# Patient Record
Sex: FEMALE | NOT HISPANIC OR LATINO | URBAN - NONMETROPOLITAN AREA
[De-identification: names, ages, dates, MRNs, and addresses within clinical notes are randomized per-mention and may not be internally consistent; named-entity substitution may affect disease eponyms.]

---

## 2018-10-24 ENCOUNTER — OFFICE VISIT CONVERTED (OUTPATIENT)
Dept: FAMILY MEDICINE CLINIC | Age: 83
End: 2018-10-24
Attending: FAMILY MEDICINE

## 2019-01-24 ENCOUNTER — OFFICE VISIT CONVERTED (OUTPATIENT)
Dept: FAMILY MEDICINE CLINIC | Age: 84
End: 2019-01-24
Attending: FAMILY MEDICINE

## 2019-01-24 ENCOUNTER — HOSPITAL ENCOUNTER (OUTPATIENT)
Dept: OTHER | Facility: HOSPITAL | Age: 84
Discharge: HOME | End: 2019-01-24
Attending: FAMILY MEDICINE

## 2019-01-24 LAB
APPEARANCE UR: CLEAR
BACTERIA UR CULT: NORMAL
BACTERIA UR QL AUTO: ABNORMAL
BILIRUB UR QL: NEGATIVE
CASTS URNS QL MICRO: ABNORMAL /[LPF]
COLOR UR: YELLOW
CONV LEUKOCYTE ESTERASE: ABNORMAL
CONV UROBILINOGEN IN URINE BY AUTOMATED TEST STRIP: 0.2 {EHRLICHU}/DL (ref 0.1–1)
EPI CELLS #/AREA URNS HPF: ABNORMAL /[HPF]
GLUCOSE 24H UR-MCNC: NEGATIVE MG/DL
HGB UR QL STRIP: NEGATIVE
KETONES UR QL STRIP: NEGATIVE MG/DL
MUCOUS THREADS URNS QL MICRO: ABNORMAL
NITRITE UR-MCNC: POSITIVE MG/ML
PH UR STRIP.AUTO: 5.5 [PH] (ref 5–8)
PROT UR-MCNC: NEGATIVE MG/DL
RBC # BLD AUTO: ABNORMAL /[HPF]
SP GR UR STRIP: 1.02 (ref 1–1.03)
SPECIMEN SOURCE: ABNORMAL
UNIDENT CRYS URNS QL MICRO: ABNORMAL /[HPF]
WBC #/AREA URNS HPF: ABNORMAL /[HPF]

## 2019-01-26 LAB
AMOXICILLIN+CLAV SUSC ISLT: 16
AMPICILLIN SUSC ISLT: >=32
AMPICILLIN+SULBAC SUSC ISLT: 16
BACTERIA UR CULT: ABNORMAL
CEFAZOLIN SUSC ISLT: <=4
CEFEPIME SUSC ISLT: <=1
CEFTAZIDIME SUSC ISLT: <=1
CEFTRIAXONE SUSC ISLT: <=1
CEFUROXIME ORAL SUSC ISLT: 4
CEFUROXIME PARENTER SUSC ISLT: 4
CIPROFLOXACIN SUSC ISLT: <=0.25
ERTAPENEM SUSC ISLT: <=0.5
GENTAMICIN SUSC ISLT: <=1
LEVOFLOXACIN SUSC ISLT: <=0.12
NITROFURANTOIN SUSC ISLT: <=16
TETRACYCLINE SUSC ISLT: <=1
TMP SMX SUSC ISLT: <=20
TOBRAMYCIN SUSC ISLT: <=1

## 2019-04-24 ENCOUNTER — OFFICE VISIT CONVERTED (OUTPATIENT)
Dept: FAMILY MEDICINE CLINIC | Age: 84
End: 2019-04-24
Attending: FAMILY MEDICINE

## 2019-06-24 ENCOUNTER — OFFICE VISIT CONVERTED (OUTPATIENT)
Dept: FAMILY MEDICINE CLINIC | Age: 84
End: 2019-06-24
Attending: FAMILY MEDICINE

## 2019-09-27 ENCOUNTER — OFFICE VISIT CONVERTED (OUTPATIENT)
Dept: FAMILY MEDICINE CLINIC | Age: 84
End: 2019-09-27
Attending: FAMILY MEDICINE

## 2019-09-27 ENCOUNTER — HOSPITAL ENCOUNTER (OUTPATIENT)
Dept: OTHER | Facility: HOSPITAL | Age: 84
Discharge: HOME | End: 2019-09-27
Attending: FAMILY MEDICINE

## 2019-09-27 LAB
ALBUMIN SERPL-MCNC: 3.8 G/DL (ref 3.5–5)
ALBUMIN/GLOB SERPL: 1.3 {RATIO} (ref 1.4–2.6)
ALP SERPL-CCNC: 58 U/L (ref 38–185)
ALT SERPL-CCNC: 8 U/L (ref 10–40)
ANION GAP SERPL CALC-SCNC: 20 MMOL/L (ref 8–19)
APPEARANCE UR: ABNORMAL
AST SERPL-CCNC: 14 U/L (ref 15–50)
BACTERIA UR CULT: NORMAL
BACTERIA UR QL AUTO: ABNORMAL
BASOPHILS # BLD MANUAL: 0.03 10*3/UL (ref 0–0.2)
BASOPHILS NFR BLD MANUAL: 0.5 % (ref 0–3)
BILIRUB SERPL-MCNC: 0.42 MG/DL (ref 0.2–1.3)
BILIRUB UR QL: NEGATIVE
BUN SERPL-MCNC: 16 MG/DL (ref 5–25)
BUN/CREAT SERPL: 14 {RATIO} (ref 6–20)
CALCIUM SERPL-MCNC: 9.8 MG/DL (ref 8.7–10.4)
CASTS URNS QL MICRO: ABNORMAL /[LPF]
CHLORIDE SERPL-SCNC: 100 MMOL/L (ref 99–111)
COLOR UR: YELLOW
CONV CO2: 22 MMOL/L (ref 22–32)
CONV LEUKOCYTE ESTERASE: ABNORMAL
CONV TOTAL PROTEIN: 6.7 G/DL (ref 6.3–8.2)
CONV UROBILINOGEN IN URINE BY AUTOMATED TEST STRIP: 1 {EHRLICHU}/DL (ref 0.1–1)
CREAT UR-MCNC: 1.18 MG/DL (ref 0.5–0.9)
DEPRECATED RDW RBC AUTO: 48.3 FL
EOSINOPHIL # BLD MANUAL: 0.15 10*3/UL (ref 0–0.7)
EOSINOPHIL NFR BLD MANUAL: 2.4 % (ref 0–7)
EPI CELLS #/AREA URNS HPF: ABNORMAL /[HPF]
ERYTHROCYTE [DISTWIDTH] IN BLOOD BY AUTOMATED COUNT: 14.1 % (ref 11.5–14.5)
GFR SERPLBLD BASED ON 1.73 SQ M-ARVRAT: 39 ML/MIN/{1.73_M2}
GLOBULIN UR ELPH-MCNC: 2.9 G/DL (ref 2–3.5)
GLUCOSE 24H UR-MCNC: NEGATIVE MG/DL
GLUCOSE SERPL-MCNC: 136 MG/DL (ref 65–99)
GRANS (ABSOLUTE): 4.02 10*3/UL (ref 2–8)
GRANS: 63.2 % (ref 30–85)
HBA1C MFR BLD: 13.8 G/DL (ref 12–16)
HCT VFR BLD AUTO: 42.6 % (ref 37–47)
HGB UR QL STRIP: NEGATIVE
IMM GRANULOCYTES # BLD: 0.02 10*3/UL (ref 0–0.54)
IMM GRANULOCYTES NFR BLD: 0.3 % (ref 0–0.43)
KETONES UR QL STRIP: NEGATIVE MG/DL
LYMPHOCYTES # BLD MANUAL: 1.56 10*3/UL (ref 1–5)
LYMPHOCYTES NFR BLD MANUAL: 9 % (ref 3–10)
MCH RBC QN AUTO: 29.8 PG (ref 27–31)
MCHC RBC AUTO-ENTMCNC: 32.4 G/DL (ref 33–37)
MCV RBC AUTO: 92 FL (ref 81–99)
MONOCYTES # BLD AUTO: 0.57 10*3/UL (ref 0.2–1.2)
MUCOUS THREADS URNS QL MICRO: ABNORMAL
NITRITE UR-MCNC: POSITIVE MG/ML
OSMOLALITY SERPL CALC.SUM OF ELEC: 287 MOSM/KG (ref 273–304)
PH UR STRIP.AUTO: 6.5 [PH] (ref 5–8)
PLATELET # BLD AUTO: 280 10*3/UL (ref 130–400)
PMV BLD AUTO: 10.6 FL (ref 7.4–10.4)
POTASSIUM SERPL-SCNC: 4.5 MMOL/L (ref 3.5–5.3)
PROT UR-MCNC: NEGATIVE MG/DL
RBC # BLD AUTO: 4.63 10*6/UL (ref 4.2–5.4)
RBC # BLD AUTO: ABNORMAL /[HPF]
SODIUM SERPL-SCNC: 137 MMOL/L (ref 135–147)
SP GR UR STRIP: 1.02 (ref 1–1.03)
SPECIMEN SOURCE: ABNORMAL
TSH SERPL-ACNC: 3.08 M[IU]/L (ref 0.27–4.2)
UNIDENT CRYS URNS QL MICRO: ABNORMAL /[HPF]
VARIANT LYMPHS NFR BLD MANUAL: 24.6 % (ref 20–45)
WBC # BLD AUTO: 6.35 10*3/UL (ref 4.8–10.8)
WBC #/AREA URNS HPF: ABNORMAL /[HPF]

## 2019-09-29 LAB
AMOXICILLIN+CLAV SUSC ISLT: <=2
AMPICILLIN SUSC ISLT: <=2
AMPICILLIN+SULBAC SUSC ISLT: <=2
BACTERIA UR CULT: ABNORMAL
CEFAZOLIN SUSC ISLT: <=4
CEFEPIME SUSC ISLT: <=1
CEFTAZIDIME SUSC ISLT: <=1
CEFTRIAXONE SUSC ISLT: <=1
CEFUROXIME ORAL SUSC ISLT: 4
CEFUROXIME PARENTER SUSC ISLT: 4
CIPROFLOXACIN SUSC ISLT: <=0.25
ERTAPENEM SUSC ISLT: <=0.5
GENTAMICIN SUSC ISLT: <=1
LEVOFLOXACIN SUSC ISLT: 0.5
NITROFURANTOIN SUSC ISLT: <=16
TETRACYCLINE SUSC ISLT: >=16
TMP SMX SUSC ISLT: >=320
TOBRAMYCIN SUSC ISLT: <=1

## 2019-12-18 ENCOUNTER — OFFICE VISIT CONVERTED (OUTPATIENT)
Dept: FAMILY MEDICINE CLINIC | Age: 84
End: 2019-12-18
Attending: FAMILY MEDICINE

## 2020-04-02 ENCOUNTER — OFFICE VISIT CONVERTED (OUTPATIENT)
Dept: FAMILY MEDICINE CLINIC | Age: 85
End: 2020-04-02
Attending: FAMILY MEDICINE

## 2021-05-18 NOTE — PROGRESS NOTES
Amber Delvalle 10/20/1925     Office/Outpatient Visit    Visit Date:  10:24 am    Provider: Pedro Mckenna MD (Assistant: Jessie Handley MA)    Location: [facility]        Electronically signed by Pedro Mckenna MD on  2019 03:13:05 PM                             SUBJECTIVE:        CC:     Ms. Delvalle is a 89 year old White female.  This is a follow-up visit.  3 MONTH CHECK UP;         HPI:         Patient presents with hTN.  Her current cardiac medication regimen includes a beta-blocker and an ACE inhibitor.  Compliance with treatment has been good.          Dx with chronic low back pain; the discomfort is most prominent in the lower thoracic spine and in the lumbar spine.  The pain does not radiate.  She characterizes it as constant and moderate in intensity.  This is a chronic problem, with essentially constant pain.  She states that the current episode of pain started years ago.  She does not recall any precipitating event or injury.  She notes some pain relief with narcotic pain medication.  Other details: MRI SHOWED DJD AND SPINAL STENOSIS.  NO CHANGE WITH INJECTIONS.  HAS FAILED VARIUS MEDS.  DOING WELL ON CURRENT MEDS.  NEEDS RX..      ROS:     CONSTITUTIONAL:  Negative for chills and fever.      E/N/T:  Negative for ear pain, nasal congestion and sore throat.      CARDIOVASCULAR:  Negative for chest pain, palpitations and pedal edema.      RESPIRATORY:  Negative for recent cough and dyspnea.      GASTROINTESTINAL:  Negative for abdominal pain, nausea and vomiting.      MUSCULOSKELETAL:  Negative for myalgias.      NEUROLOGICAL:  Positive for weakness ( generalized ).          PMH/FMH/SH:     Last Reviewed on 2019 03:10 PM by Pedro Mckenna    Past Medical History:                 PAST MEDICAL HISTORY             GYNECOLOGICAL HISTORY:             CURRENT MEDICAL PROVIDERS:    Oncologist         PREVENTIVE HEALTH MAINTENANCE             COLORECTAL  CANCER SCREENING: Up to date (colonoscopy q10y; sigmoidoscopy q5y; Cologuard q3y) was last done 1998; colonoscopy with normal results         Surgical History:         Appendectomy    Cholecystectomy    Tonsillectomy/Adenoidectomy      Hysterectomy: COMPLETE, WITH BSO;;;     Joint Replacement: LEGT/RIGHT TKR;;;         Family History:     Father: Coronary Artery Disease         Social History:     Occupation:    Retired         Tobacco/Alcohol/Supplements:     Last Reviewed on 4/24/2019 03:10 PM by Pedro Mckenna    Tobacco: She has a past history of cigarette smoking; quit date:  1993.  Non-drinker     Caffeine:  She admits to consuming caffeine via coffee ( 2 servings per day ).          Substance Abuse History:     Last Reviewed on 4/24/2019 03:10 PM by Pedro Mckenna    None         Mental Health History:     Last Reviewed on 10/24/2018 11:31 AM by Rubina Shearer        Communicable Diseases (eg STDs):     Last Reviewed on 10/24/2018 11:31 AM by Rubina Shearer            Current Problems:     Last Reviewed on 4/24/2019 03:11 PM by Pedro Mckenna    Urinary incontinence     Generalized weakness     Breast cancer     GERD     HTN     Acquired hypothyroidism     Chronic low back pain     Use of high risk medications         Immunizations:     Fluzone High-Dose pf (>=65 yr) 10/23/2018     Fluzone High-Dose pf (>=65 yr) 10/24/2017         Allergies:     Last Reviewed on 4/24/2019 03:10 PM by Pedro Mckenna      No Known Drug Allergies.         Current Medications:     Last Reviewed on 4/24/2019 03:10 PM by Pedro Mckenna    Fentanyl 25mcg/1hr Transdermal Patch Apply 1 patch(es) to upper torso as directed every 72 hours     Levothyroxine Sodium 50mcg Capsules 1 p.o. daily     Lisinopril 10mg Tablet Take 1 tablet(s) by mouth daily     Metoprolol Succinate 50mg Tablets, Extended Release Take 1 tablet(s) by mouth daily     Ranitidine 75mg Tablet Take 1 tablet(s) by mouth q           OBJECTIVE:        Vitals:         Current: 4/24/2019 10:32:38 AM    Ht:  5 ft, 7 in;  Wt: 227 lbs;  BMI: 35.6    T: 98.2 F (oral);  BP: 150/64 mm Hg (left arm, sitting);  P: 68 bpm (left arm (BP Cuff), sitting)        Exams:     PHYSICAL EXAM:     GENERAL: vital signs recorded - well developed, well nourished;  no apparent distress;     NECK: trachea is midline; thyroid is non-palpable; carotid exam reveals no bruits;     RESPIRATORY: normal respiratory rate and pattern with no distress; normal breath sounds with no rales, rhonchi, wheezes or rubs;     CARDIOVASCULAR: normal rate; rhythm is regular;  no systolic murmur; trace pedal edema;     GASTROINTESTINAL: nontender;     LYMPHATIC: no enlargement of cervical or facial nodes;     MUSCULOSKELETAL: gait: slowed and uses a walker;  No CVA tenderness     NEUROLOGIC: GROSSLY INTACT     PSYCHIATRIC:  appropriate affect and demeanor; normal speech pattern; grossly normal memory;         ASSESSMENT           401.1   I10  HTN              DDx:     724.2   M54.16  Chronic low back pain              DDx:         PLAN:          HTN         MEDICATIONS: (no change to current medication regimen)     FOLLOW-UP: Schedule a follow-up visit in 3 months.           Chronic low back pain     Controlled substance documentation: Markell reviewed; prior drug screen consistent; consent is reviewed and signed and on the chart.  She is aware of risk of addiction on this medication, understands that she will need to follow up for a review every 3 months and her medications will be adjusted or decreased as deemed appropriate at each visit.  No history of drug or alcohol abuse.  No concerns about diversion or abuse. She denies side effects related to the medication.  She is aware that she may be called in for pill counts.  The dosing of this medication will be reviewed on a regular basis and reduced if possible..  Ongoing use of a controlled substance is necessary for this patient  to have a normal quality of life             Patient Recommendations:        For  HTN:     Schedule a follow-up visit in 3 months.              CHARGE CAPTURE           **Please note: ICD descriptions below are intended for billing purposes only and may not represent clinical diagnoses**        Primary Diagnosis:         401.1 HTN            I10    Essential (primary) hypertension              Orders:          62403   Office/outpatient visit; established patient, level 4  (In-House)           724.2 Chronic low back pain            M54.16    Radiculopathy, lumbar region

## 2021-05-18 NOTE — PROGRESS NOTES
Amber Delvalle 10/20/1925     Office/Outpatient Visit    Visit Date: Fri, Sep 27, 2019 09:38 am    Provider: Pedro Mckenna MD (Assistant: Nirmala Morrison MA)    Location: [facility]        Electronically signed by Pedro Mckenna MD on  09/27/2019 12:16:41 PM                             SUBJECTIVE:        CC:     Ms. Delvalle is a 89 year old White female.  This is a follow-up visit.  MCW;         HPI:         Ms. Delvalle is here for a Medicare wellness visit.  The required HRA questions are integrated within this visit note. Family medical history and individual medical/surgical history were reviewed and updated.  A current height, weight, BMI, blood pressure, and pulse were recorded in the vitals section of the note and have been reviewed. Patient's medications, including supplements, were recorded in the chart and reviewed.  Current providers and suppliers were reviewed and updated.          Self-Assessment of Health: She rates her health as fair.  . Her physical/emotional health has limited her social activites not at all.  A review of possible cognitive impairment was performed and the following was noted: Memory Impairment Screen is normal.  A review of functional ability and level of safety was performed and the following was noted: Bathing ( Performs with assistance ); Dressing: ( performs independently ); Eating: ( performs independently ); Toilet use: ( performs independently ); Transferring: ( performs independently ) Falls Risk: Has not had any falls or only one fall without injury in the past year.  She denies having trouble hearing the TV/radio when others do not, having to strain to hear or understand conversations and wearing hearing aid(s).  Concerning home safety, she reports that at home she DOES have adequate lighting, a skid resistant shower/tub, grab bars in the bath, handrails on stairs, functioning smoke alarms and absence of throw rugs.          Immunization Status:  UNKNOWN; Physical Activity: She never excercises.; Type of diet patient normally eats is described as well-balanced with fruits and vegetables Tobacco: She has never smoked. She has a past history of cigarette smoking; quit date:  .  Preventative Health updated today         PHQ-9 Depression Screening: Completed form scanned and in chart; Total Score 2     ROS:     CONSTITUTIONAL:  Negative for chills and fever.      EYES:  Positive for blurred vision.   Negative for eye drainage.      E/N/T:  Negative for ear pain, nasal congestion and sore throat.      CARDIOVASCULAR:  Negative for chest pain, palpitations and pedal edema.      RESPIRATORY:  Negative for recent cough and dyspnea.      GASTROINTESTINAL:  Negative for abdominal pain, anorexia, constipation, diarrhea, nausea and vomiting.      GENITOURINARY:  Negative for dysuria and hematuria.      MUSCULOSKELETAL:  Negative for myalgias.      NEUROLOGICAL:  Positive for weakness ( generalized ).          PMH/FMH/SH:     Last Reviewed on 2019 09:54 AM by Pedro Mckenna    Past Medical History:                 PAST MEDICAL HISTORY             GYNECOLOGICAL HISTORY:             PREVENTIVE HEALTH MAINTENANCE             COLORECTAL CANCER SCREENING: Up to date (colonoscopy q10y; sigmoidoscopy q5y; Cologuard q3y) was last done ; colonoscopy with normal results     Hepatitis C Medicare Screening: Not indicated     MAMMOGRAM: no longer indicated     PAP SMEAR: No longer indicated due to age and history         Surgical History:         Appendectomy    Cholecystectomy    Tonsillectomy/Adenoidectomy      Hysterectomy: COMPLETE, WITH BSO;;;     Joint Replacement: LEGT/RIGHT TKR;;;         Family History:     Father: Coronary Artery Disease         Social History:     Occupation:    Retired         Tobacco/Alcohol/Supplements:     Last Reviewed on 2019 09:52 AM by Pedro Mckenna    Tobacco: She has a past history of cigarette smoking;  quit date:  1993.  Non-drinker     Caffeine:  She admits to consuming caffeine via coffee ( 2 servings per day ).          Substance Abuse History:     Last Reviewed on 9/27/2019 09:52 AM by Pedro Mckenna    None         Mental Health History:     Last Reviewed on 10/24/2018 11:31 AM by Rubina Shearer        Communicable Diseases (eg STDs):     Last Reviewed on 10/24/2018 11:31 AM by Rubina Shearer            Current Problems:     Last Reviewed on 9/27/2019 09:56 AM by Pedro Mckenna    History of breast cancer     Urinary incontinence     Generalized weakness     GERD     HTN     Acquired hypothyroidism     Chronic low back pain     Use of high risk medications     Screening for depression         Immunizations:     Fluzone High-Dose pf (>=65 yr) 10/23/2018     Fluzone High-Dose pf (>=65 yr) 10/24/2017         Allergies:     Last Reviewed on 9/27/2019 09:52 AM by Pedro Mckenna      No Known Drug Allergies.         Current Medications:     Last Reviewed on 9/27/2019 09:55 AM by Pedro Mckenna    Fentanyl 25mcg/1hr Transdermal Patch Apply 1 patch(es) to upper torso as directed every 72 hours     Lisinopril 10mg Tablet Take 1 tablet(s) by mouth daily     Metoprolol Succinate 50mg Tablets, Extended Release Take 1 tablet(s) by mouth daily     Ranitidine 75mg Tablet Take 1 tablet(s) by mouth q hs     Levothyroxine Sodium 50mcg Capsules 1 p.o. daily         OBJECTIVE:        Vitals:         Current: 9/27/2019 9:48:49 AM    Ht:  5 ft, 7 in;  Wt: 231.4 lbs;  BMI: 36.2    T: 98.6 F (oral);  BP: 153/66 mm Hg (left arm, sitting);  P: 77 bpm (left arm (BP Cuff), sitting)    VA: 20/400 OD, 20/400 OS (with correction)        Exams:     PHYSICAL EXAM:     GENERAL: vital signs recorded - well developed, well nourished;  no apparent distress;     EYES: conjunctiva and cornea are normal;     E/N/T:  normal EACs, TMs, nasal/oral mucosa, teeth, gingiva, and oropharynx;     NECK: trachea is  midline; thyroid is non-palpable; carotid exam reveals no bruits;     RESPIRATORY: normal respiratory rate and pattern with no distress; normal breath sounds with no rales, rhonchi, wheezes or rubs;     CARDIOVASCULAR: normal rate; rhythm is regular;  no systolic murmur; trace pedal edema;     GASTROINTESTINAL: nontender;     LYMPHATIC: no enlargement of cervical or facial nodes; no supraclavicular nodes;     MUSCULOSKELETAL: gait: slowed and uses a walker;  No CVA tenderness     NEUROLOGIC: GROSSLY INTACT     PSYCHIATRIC:  appropriate affect and demeanor; normal speech pattern; grossly normal memory;         Lab/Test Results:             Urine temperature:  confirmed (09/27/2019),     All urine drug screen levels confirmed negative:  yes (09/27/2019),     Date and time of last pill:  Fentanyl Transdermal Patch 9/26/19 per patient. (09/27/2019),     Performed by:  mikey (09/27/2019),     Collection Time:  1020 (09/27/2019),             Procedures:     Vaccination against other viral diseases, Influenza     1. Influenza high dose 0.5 ml unit dose, KG, ABN signed given IM in the left upper arm;  lot number [id number]; expires 03/26/2020 Regarding contraindications to an Influenza vaccine: Denies moderate/severe illness with/without fever; serious reaction to eggs, egg proteins, gentamicin, gelatin, arginine, neomycin or polymixin; serious reaction after recieving previous influenza vaccines; and history of Guillain-Beaumont Syndrome.              ASSESSMENT           V70.0   Z00.00  Health checkup              DDx:     V58.69   Z79.899  Use of high risk medications              DDx:     401.1   I10  HTN              DDx:     V04.81   Z23  Vaccination against other viral diseases, Influenza              DDx:     V79.0   Z13.89  Screening for depression              DDx:         ORDERS:         Lab Orders:       62196  Drug test prsmv qual dir optical obs per day  (In-House)         68306  [facility] CBC with 3 part diff   (Send-Out)         59304  COMP - [facility] Comp. Metabolic Panel  (Send-Out)         06610  TSH [facility] TSH  (Send-Out)         01954  [facility]UA - [facility] Urinalysis, automated, with micro  (Send-Out)           Procedures Ordered:       49256  Fluzone High Dose  (In-House)           Annual wellness visit, includes a PPPS, subsequent visit  (In-House)           Other Orders:         Depression screen negative  (In-House)         1101F  Pt screen for fall risk; document no falls in past year or only 1 fall w/o injury in past year (CORNELL)  (In-House)           Administration of influenza virus vaccine (x1)                 PLAN:          Health checkup         COUNSELING provided on: fall prevention, healthy eating habits, Medicare Preventative Services Guide given to patient., use of seat belts, Advance Directive info given., and ADVISED TO SEE AN EYE DOCTOR AND DENTIST REGULARLY.      FOLLOW-UP: Schedule a follow-up visit in 3 months.  MIPS Negative Depression Screen           Orders:         Depression screen negative  (In-House)         1101F  Pt screen for fall risk; document no falls in past year or only 1 fall w/o injury in past year (CORNELL)  (In-House)           Annual wellness visit, includes a PPPS, subsequent visit  (In-House)             Patient Education Handouts:       Advance Directive           Use of high risk medications     LABORATORY:  Labs ordered to be performed today include Drug screen.            Orders:       20077  Drug test prsmv qual dir optical obs per day  (In-House)            HTN     LABORATORY:  Labs ordered to be performed today include CBC, Comprehensive metabolic panel, TSH, and urinalysis with micro.            Orders:       11499  [facility] CBC with 3 part diff  (Send-Out)         54228  COMP [facility] Comp. Metabolic Panel  (Send-Out)         37077  TSH [facility] TSH  (Send-Out)         [facility]  [facility]  ([facility])            Vaccination against  other viral diseases, Influenza         IMMUNIZATIONS given today: Influenza HIGH Dose.            Orders:       01452  Fluzone High Dose  (In-House)                     Administration of influenza virus vaccine (x1)             Patient Recommendations:        For  Health checkup:         Regularly exercise within recommended guidelines, especially to maintain balance. Remove obstacles in walkways at home.  Use non-skid material for bathtub safety.  Remove loose throw rugs from floor. Use nightlights in bedrooms, hallways, and bathrooms.    Limit dietary intake of fat (especially saturated fat) and cholesterol.  Eat a variety of foods, including plenty of fruits, vegetables, and grain containg fiber, limit fat intake to 30% of total calories. Balance caloric intake with energy expended.    Always use shoulder/lap restraints when driving or riding in a vehicle, even those equipped with air bags.  Schedule a follow-up visit in 3 months.              CHARGE CAPTURE           **Please note: ICD descriptions below are intended for billing purposes only and may not represent clinical diagnoses**        Primary Diagnosis:         V70.0 Health checkup            Z00.00    Encounter for general adult medical examination without abnormal findings              Orders:             Depression screen negative  (In-House)             1101F   Pt screen for fall risk; document no falls in past year or only 1 fall w/o injury in past year (CORNELL)  (In-House)                Annual wellness visit, includes a PPPS, subsequent visit  (In-House)           V58.69 Use of high risk medications            Z79.899    Other long term (current) drug therapy              Orders:          82934   Drug test prsmv qual dir optical obs per day  (In-House)           401.1 HTN            I10    Essential (primary) hypertension    V04.81 Vaccination against other viral diseases, Influenza            Z23    Encounter for immunization               Orders:          77915   Fluzone High Dose  (In-House)                                           Administration of influenza virus vaccine (x1)         V79.0 Screening for depression            Z13.89    Encounter for screening for other disorder

## 2021-05-18 NOTE — PROGRESS NOTES
Amber Delvalle  10/20/1925     Office/Outpatient Visit    Visit Date: Thu, Apr 2, 2020 01:08 pm    Provider: Cici Mckenna MD (Assistant: Nirmala Morrison MA)    Location: [facility]        Electronically signed by Cici Mckenna MD on  04/02/2020 01:42:49 PM                             Subjective:        CC: PATIENT DOES NOT KNOW WHAT MEDICATIONS SHE IS TAKING Ms. Delvalle is a 89 year old White female.  This is a follow-up visit.  check up.  PRESENT:  CICI STEELE MD;         HPI:           Patient presents with acquired hypothyroidism.  This was first diagnosed several years ago.  She is currently taking Levothyroid.  TSH was last checked more than 6 months ago.  The result was reported as normal.            Additionally, she presents with history of essential (primary) hypertension.  this was first diagnosed several years ago.  Her current cardiac medication regimen includes a beta-blocker and an ACE inhibitor.  Compliance with treatment has been good.  She is tolerating the medication well without side effects.  Ms. Delvalle does not check her blood pressure other than at her clinic appointments.            Radiculopathy, lumbar region details; the discomfort is most prominent in the lower thoracic spine and in the lumbar spine.  The pain does not radiate.  She characterizes it as constant and moderate in intensity.  This is a chronic problem, with essentially constant pain.  She states that the current episode of pain started years ago.  She does not recall any precipitating event or injury.  She notes some pain relief with narcotic pain medication.  Other details: MRI SHOWED DJD AND SPINAL STENOSIS.  NO CHANGE WITH INJECTIONS.  HAS FAILED VARIUS MEDS.  DOING WELL ON CURRENT MEDS.  NEEDS RX..            With regard to the gastro-esophageal reflux disease without esophagitis, the location of the discomfort is primarily epigastric.  She describes the pain as burning.   Symptoms are improved with a prescription-strength H-2 blocker.      ROS:     CONSTITUTIONAL:  Negative for chills and fever.      E/N/T:  Negative for ear pain, nasal congestion and sore throat.      CARDIOVASCULAR:  Negative for chest pain, palpitations and pedal edema.      RESPIRATORY:  Negative for recent cough and dyspnea.      GASTROINTESTINAL:  Negative for abdominal pain, nausea and vomiting.      MUSCULOSKELETAL:  Negative for myalgias.      NEUROLOGICAL:  Positive for weakness ( generalized ).   Negative for headaches.          Past Medical History / Family History / Social History:         Last Reviewed on 2020 01:20 PM by Pedro Mckenna    Past Medical History:                 PAST MEDICAL HISTORY             GYNECOLOGICAL HISTORY:                 ADVANCED DIRECTIVES: None         PREVENTIVE HEALTH MAINTENANCE             COLORECTAL CANCER SCREENING: Up to date (colonoscopy q10y; sigmoidoscopy q5y; Cologuard q3y) was last done ; colonoscopy with normal results     DENTAL CLEANING: has full dentures     EYE EXAM: was last done      Hepatitis C Medicare Screening: Not indicated     MAMMOGRAM: no longer indicated     PAP SMEAR: No longer indicated due to age and history         Surgical History:         Appendectomy    Cholecystectomy    Tonsillectomy/Adenoidectomy     Hysterectomy: COMPLETE, WITH BSO;;;     Joint Replacement: LEGT/RIGHT TKR;;;         Family History:     Father: Coronary Artery Disease         Social History:     Occupation:    Retired         Tobacco/Alcohol/Supplements:     Last Reviewed on 2020 01:20 PM by Pedro Mckenna    Tobacco: She has a past history of cigarette smoking; quit date:  .  Non-drinker     Caffeine:  She admits to consuming caffeine via coffee ( 2 servings per day ).          Substance Abuse History:     Last Reviewed on 2020 01:19 PM by Pedro Mckenna    None         Mental Health History:     Last Reviewed  on 10/24/2018 11:31 AM by Rubina Shearer        Communicable Diseases (eg STDs):     Last Reviewed on 10/24/2018 11:31 AM by Rubina Shearer        Current Problems:     Last Reviewed on 4/02/2020 01:20 PM by Pedro Mckenna    Essential (primary) hypertension    Radiculopathy, lumbar region    Weakness    Other long term (current) drug therapy    Acquired hypothyroidism    Urinary incontinence    History of breast cancer    Chronic kidney disease, Stage III (moderate)        Immunizations:     Fluzone High-Dose pf (>=65 yr) 10/23/2018    Fluzone High-Dose pf (>=65 yr) 10/24/2017    Fluzone High-Dose pf (>=65 yr) 9/27/2019        Allergies:     Last Reviewed on 4/02/2020 01:20 PM by Pedro Mckenna    No Known Allergies.        Current Medications:     Last Reviewed on 4/02/2020 01:20 PM by Pedro Mckenna    Levothyroxine Sodium 50mcg Capsules [1 p.o. daily]    metoprolol succinate 50 mg oral Tablet, Extended Release 24 hr [TAKE 1 TABLET(S) BY MOUTH DAILY]    lisinopriL 10 mg oral tablet [TAKE 1 TABLET(S) BY MOUTH TWICE DAILY]    fentaNYL 25 mcg/hr Transdermal Patch, Transdermal 72 Hours [APPLY 1 PATCH TO UPPER TORSO AS DIRECTED EVERY 72 HOURS]    Pepcid 20 mg oral tablet [take 1 tablet (20 mg) by oral route daily]        Assessment:         E03.8   Other specified hypothyroidism       I10   Essential (primary) hypertension       M54.16   Radiculopathy, lumbar region       K21.9   Gastro-esophageal reflux disease without esophagitis           ORDERS:         Meds Prescribed:       [Refilled] fentaNYL 25 mcg/hr Transdermal Patch, Transdermal 72 Hours [APPLY 1 PATCH TO UPPER TORSO AS DIRECTED EVERY 72 HOURS], #10 (ten) each, Refills: 0 (zero)                 Plan:         Other specified hypothyroidism        MEDICATIONS: (no change to current medication regimen)     FOLLOW-UP: Schedule a follow-up visit in 3 months.  Telehealth: Verbal consent obtained for visit to occur via phone call;  Total time spent was 6 minutes; [id number]--Hxashybfn E/M 5-10 minutes         Essential (primary) hypertension        MEDICATIONS: (no change to current medication regimen)     RECOMMENDATIONS given include: perform routine monitoring of blood pressure with home blood pressure cuff.          Radiculopathy, lumbar region    Controlled substance documentation: Markell reviewed; prior drug screen consistent; consent is reviewed and signed and on the chart.  She is aware of risk of addiction on this medication, understands that she will need to follow up for a review every 3 months and her medications will be adjusted or decreased as deemed appropriate at each visit.  No history of drug or alcohol abuse.  No concerns about diversion or abuse. She denies side effects related to the medication.  She is aware that she may be called in for pill counts.  The dosing of this medication will be reviewed on a regular basis and reduced if possible..  Ongoing use of a controlled substance is necessary for this patient to have a normal quality of life           Prescriptions:       [Refilled] fentaNYL 25 mcg/hr Transdermal Patch, Transdermal 72 Hours [APPLY 1 PATCH TO UPPER TORSO AS DIRECTED EVERY 72 HOURS], #10 (ten) each, Refills: 0 (zero)         Gastro-esophageal reflux disease without esophagitis        MEDICATIONS: (no change to current medication regimen)             Patient Recommendations:        For  Other specified hypothyroidism:    Schedule a follow-up visit in 3 months.          For  Essential (primary) hypertension:    Begin monitoring your blood pressure by brief nurse visits at our office, a home blood pressure monitor, or by checking on the machines in pharmacies or stores.  Keep a log of the readings.              Charge Capture:         Primary Diagnosis:     E03.8  Other specified hypothyroidism           Orders:      62239  Phys/QHP telephone evaluation 5-10 min  (In-House)              I10  Essential (primary)  hypertension     M54.16  Radiculopathy, lumbar region     K21.9  Gastro-esophageal reflux disease without esophagitis         ADDENDUMS:      ____________________________________    Addendum: 05/19/2020 11:23 AM - Two, Team         Visit Note Faxed to:        User Entered Recipient; Number [phone]

## 2021-05-18 NOTE — PROGRESS NOTES
Amber Delvalle 10/20/1925     Office/Outpatient Visit    Visit Date: Mon, Jun 24, 2019 10:53 am    Provider: Pedro Mckenna MD (Assistant: Surekha Espinoza LPN)    Location: [facility]        Electronically signed by Pedro Mckenna MD on  06/24/2019 12:18:19 PM                             SUBJECTIVE:        CC:     Ms. Delvalle is a 89 year old White female.  This is a follow-up visit.  3 month follow up;         HPI:         PHQ-9 Depression Screening: Completed form scanned and in chart; Total Score 4 Alcohol Consumption Screening: Completed form scanned and in chart; Total Score 0         With regard to the hTN, her current cardiac medication regimen includes a beta-blocker and an ACE inhibitor.  Compliance with treatment has been good.          Additionally, she presents with history of chronic low back pain.  the discomfort is most prominent in the lower thoracic spine and in the lumbar spine.  The pain does not radiate.  She characterizes it as constant and moderate in intensity.  This is a chronic problem, with essentially constant pain.  She states that the current episode of pain started years ago.  She does not recall any precipitating event or injury.  She notes some pain relief with narcotic pain medication.  Other details: MRI SHOWED DJD AND SPINAL STENOSIS.  NO CHANGE WITH INJECTIONS.  HAS FAILED VARIUS MEDS.  DOING WELL ON CURRENT MEDS.  NEEDS RX..      ROS:     CONSTITUTIONAL:  Negative for chills and fever.      E/N/T:  Negative for ear pain, nasal congestion and sore throat.      CARDIOVASCULAR:  Negative for chest pain, palpitations and pedal edema.      RESPIRATORY:  Negative for recent cough and dyspnea.      GASTROINTESTINAL:  Negative for abdominal pain, nausea and vomiting.      MUSCULOSKELETAL:  Negative for myalgias.      NEUROLOGICAL:  Positive for weakness ( generalized ).          PMH/FMH/SH:     Last Reviewed on 6/24/2019 12:13 PM by Pedro Mckenna    Past Medical  History:                 PAST MEDICAL HISTORY             GYNECOLOGICAL HISTORY:             PREVENTIVE HEALTH MAINTENANCE             COLORECTAL CANCER SCREENING: Up to date (colonoscopy q10y; sigmoidoscopy q5y; Cologuard q3y) was last done ; colonoscopy with normal results         Surgical History:         Appendectomy    Cholecystectomy    Tonsillectomy/Adenoidectomy      Hysterectomy: COMPLETE, WITH BSO;;;     Joint Replacement: LEGT/RIGHT TKR;;;         Family History:     Father: Coronary Artery Disease         Social History:     Occupation:    Retired         Tobacco/Alcohol/Supplements:     Last Reviewed on 2019 11:09 AM by Pedro Mckenna    Tobacco: She has a past history of cigarette smoking; quit date:  .  Non-drinker     Caffeine:  She admits to consuming caffeine via coffee ( 2 servings per day ).          Substance Abuse History:     Last Reviewed on 2019 12:13 PM by Pedro Mckenna    None         Mental Health History:     Last Reviewed on 10/24/2018 11:31 AM by Rubina Shearer        Communicable Diseases (eg STDs):     Last Reviewed on 10/24/2018 11:31 AM by Rubina Shearer            Current Problems:     Last Reviewed on 2019 12:14 PM by Pedro Mckenna    History of breast cancer     Urinary incontinence     Generalized weakness     GERD     HTN     Acquired hypothyroidism     Chronic low back pain     Use of high risk medications         Immunizations:     Fluzone High-Dose pf (>=65 yr) 10/23/2018     Fluzone High-Dose pf (>=65 yr) 10/24/2017         Allergies:     Last Reviewed on 2019 12:13 PM by Pedro Mckenna      No Known Drug Allergies.         Current Medications:     Last Reviewed on 2019 12:14 PM by Pedro Mckenna    Fentanyl 25mcg/1hr Transdermal Patch Apply 1 patch(es) to upper torso as directed every 72 hours     Levothyroxine Sodium 50mcg Capsules 1 p.o. daily     Lisinopril 10mg Tablet Take 1  tablet(s) by mouth daily     Metoprolol Succinate 50mg Tablets, Extended Release Take 1 tablet(s) by mouth daily     Ranitidine 75mg Tablet Take 1 tablet(s) by mouth q hs         OBJECTIVE:        Vitals:         Current: 6/24/2019 11:02:38 AM    Ht:  5 ft, 7 in;  Wt: 225 lbs;  BMI: 35.2    T: 98 F (oral);  BP: 151/92 mm Hg (right arm, sitting);  P: 59 bpm (left arm (BP Cuff), sitting)        Repeat:     11:05:00 AM     BP:   143/50mm Hg (right arm, sitting)         Exams:     PHYSICAL EXAM:     GENERAL: vital signs recorded - well developed, well nourished;  no apparent distress;     NECK: trachea is midline; thyroid is non-palpable; carotid exam reveals no bruits;     RESPIRATORY: normal respiratory rate and pattern with no distress; normal breath sounds with no rales, rhonchi, wheezes or rubs;     CARDIOVASCULAR: normal rate; rhythm is regular;  no systolic murmur; trace pedal edema;     GASTROINTESTINAL: nontender;     LYMPHATIC: no enlargement of cervical or facial nodes;     MUSCULOSKELETAL: gait: slowed and uses a walker;  No CVA tenderness     NEUROLOGIC: GROSSLY INTACT     PSYCHIATRIC:  appropriate affect and demeanor; normal speech pattern; grossly normal memory;         ASSESSMENT           V79.0   Z13.89  Screening for depression              DDx:     724.2   M54.16  Chronic low back pain              DDx:     401.1   I10  HTN              DDx:         ORDERS:         Meds Prescribed:       Refill of: Fentanyl 25mcg/1hr Transdermal Patch Apply 1 patch(es) to upper torso as directed every 72 hours  #10 (Ten) patch(es) Refills: 0         Lab Orders:       APPTO  Appointment need  (In-House)           Other Orders:         Depression screen negative  (In-House)           Negative EtOH screen  (In-House)                   PLAN:          Screening for depression     MIPS Negative Depression Screen Negative alcohol screen     FOLLOW-UP: Schedule a follow-up visit in 3 months..   for ROUTINE LABS AT  NEXT VISIT for Medicare Wellness Visit           Orders:       APPTO  Appointment need  (In-House)           Depression screen negative  (In-House)           Negative EtOH screen  (In-House)            Chronic low back pain     Controlled substance documentation: Markell reviewed; prior drug screen consistent; consent is reviewed and signed and on the chart.  She is aware of risk of addiction on this medication, understands that she will need to follow up for a review every 3 months and her medications will be adjusted or decreased as deemed appropriate at each visit.  No history of drug or alcohol abuse.  No concerns about diversion or abuse. She denies side effects related to the medication.  She is aware that she may be called in for pill counts.  The dosing of this medication will be reviewed on a regular basis and reduced if possible..  Ongoing use of a controlled substance is necessary for this patient to have a normal quality of life           Prescriptions:       Refill of: Fentanyl 25mcg/1hr Transdermal Patch Apply 1 patch(es) to upper torso as directed every 72 hours  #10 (Ten) patch(es) Refills: 0          HTN         MEDICATIONS: (no change to current medication regimen)             Patient Recommendations:        For  Screening for depression:     Schedule a follow-up visit in 3 months.                APPOINTMENT INFORMATION:        Monday Tuesday Wednesday Thursday Friday Saturday Sunday            Time:___________________AM  PM   Date:_____________________             CHARGE CAPTURE           **Please note: ICD descriptions below are intended for billing purposes only and may not represent clinical diagnoses**        Primary Diagnosis:         V79.0 Screening for depression            Z13.89    Encounter for screening for other disorder              Orders:          APPTO   Appointment need  (In-House)                Depression screen negative  (In-House)                 Negative EtOH screen  (In-House)           724.2 Chronic low back pain            M54.16    Radiculopathy, lumbar region              Orders:          [id number]   Office/outpatient visit; established patient, level 4  (In-House)           401.1 HTN            I10    Essential (primary) hypertension

## 2021-05-18 NOTE — PROGRESS NOTES
Amber Delvalle  10/20/1925     Office/Outpatient Visit    Visit Date: Wed, Dec 18, 2019 09:35 am    Provider: Pedro Mckenna MD (Assistant: Jessie Handley MA)    Location: [facility]        Electronically signed by Pedro Mckenna MD on  12/18/2019 12:56:41 PM                             Subjective:        CC: Ms. Delvalle is a 89 year old White female.  This is a follow-up visit.  3 months;         HPI:           Ms. Delvalle presents with essential (primary) hypertension.  This was first diagnosed several years ago.  Her current cardiac medication regimen includes a beta-blocker and an ACE inhibitor.  Compliance with treatment has been good.  She is tolerating the medication well without side effects.  Ms. Delvalle does not check her blood pressure other than at her clinic appointments.            Radiculopathy, lumbar region details; the discomfort is most prominent in the lower thoracic spine and in the lumbar spine.  The pain does not radiate.  She characterizes it as constant and moderate in intensity.  This is a chronic problem, with essentially constant pain.  She states that the current episode of pain started years ago.  She does not recall any precipitating event or injury.  She notes some pain relief with narcotic pain medication.  Other details: MRI SHOWED DJD AND SPINAL STENOSIS.  NO CHANGE WITH INJECTIONS.  HAS FAILED VARIUS MEDS.  DOING WELL ON CURRENT MEDS.  NEEDS RX..      ROS:     CONSTITUTIONAL:  Negative for chills and fever.      E/N/T:  Negative for ear pain, nasal congestion and sore throat.      CARDIOVASCULAR:  Negative for chest pain, palpitations and pedal edema.      RESPIRATORY:  Negative for recent cough and dyspnea.      GASTROINTESTINAL:  Negative for abdominal pain, nausea and vomiting.      MUSCULOSKELETAL:  Negative for myalgias.      NEUROLOGICAL:  Positive for weakness ( generalized ).   Negative for headaches.          Past Medical History / Family  History / Social History:         Last Reviewed on 2019 09:58 AM by Pedro Mckenna    Past Medical History:                 PAST MEDICAL HISTORY             GYNECOLOGICAL HISTORY:                 ADVANCED DIRECTIVES: None         PREVENTIVE HEALTH MAINTENANCE             COLORECTAL CANCER SCREENING: Up to date (colonoscopy q10y; sigmoidoscopy q5y; Cologuard q3y) was last done ; colonoscopy with normal results     DENTAL CLEANING: has full dentures     EYE EXAM: was last done      Hepatitis C Medicare Screening: Not indicated     MAMMOGRAM: no longer indicated     PAP SMEAR: No longer indicated due to age and history         Surgical History:         Appendectomy    Cholecystectomy    Tonsillectomy/Adenoidectomy     Hysterectomy: COMPLETE, WITH BSO;;;     Joint Replacement: LEGT/RIGHT TKR;;;         Family History:     Father: Coronary Artery Disease         Social History:     Occupation:    Retired         Tobacco/Alcohol/Supplements:     Last Reviewed on 2019 09:58 AM by Pedro Mckenna    Tobacco: She has a past history of cigarette smoking; quit date:  .  Non-drinker     Caffeine:  She admits to consuming caffeine via coffee ( 2 servings per day ).          Substance Abuse History:     Last Reviewed on 2019 09:58 AM by Pedro Mckenna    None         Mental Health History:     Last Reviewed on 10/24/2018 11:31 AM by Rubina Shearer        Communicable Diseases (eg STDs):     Last Reviewed on 10/24/2018 11:31 AM by Rubina Shearer        Current Problems:     Last Reviewed on 2019 09:58 AM by Pedro Mckenna    Essential (primary) hypertension    Radiculopathy, lumbar region    Weakness    Other long term (current) drug therapy    Chronic low back pain    GERD    Generalized weakness    Acquired hypothyroidism    HTN    Use of high risk medications    Hyperlipidemia, unspecified    Urinary incontinence    History of breast cancer     Chronic kidney disease, Stage III (moderate)        Immunizations:     Fluzone High-Dose pf (>=65 yr) 10/23/2018    Fluzone High-Dose pf (>=65 yr) 10/24/2017    Fluzone High-Dose pf (>=65 yr) 9/27/2019        Allergies:     Last Reviewed on 12/18/2019 09:58 AM by Pedro Mckenna    No Known Allergies.        Current Medications:     Last Reviewed on 12/18/2019 09:58 AM by Pedro Mckenna    Levothyroxine Sodium 50mcg Capsules [1 p.o. daily]    Fentanyl 25 mcg/hr Transdermal Patch, Transdermal 72 Hours [Apply 1 patch(es) to upper torso as directed every 72 hours ]    lisinopril 10 mg oral tablet [TAKE 1 TABLET(S) BY MOUTH DAILY]    metoprolol succinate 50 mg oral Tablet, Extended Release 24 hr [TAKE 1 TABLET(S) BY MOUTH DAILY]    Pepcid 20 mg oral tablet [take 1 tablet (20 mg) by oral route daily]        Objective:        Vitals:         Current: 12/18/2019 9:43:47 AM    Ht:  5 ft, 7 in;  Wt: 237.2 lbs;  BMI: 37.2T: 98.3 F (oral);  BP: 184/81 mm Hg (left arm, sitting);  P: 86 bpm (left arm (BP Cuff), sitting);  sCr: 1.18 mg/dL;  GFR: 37.19        Repeat:     9:45:44 AM  BP:   171/72mm Hg (right arm, sitting)     Exams:     PHYSICAL EXAM:     NECK: trachea is midline; thyroid is non-palpable;     RESPIRATORY: normal respiratory rate and pattern with no distress; normal breath sounds with no rales, rhonchi, wheezes or rubs;     CARDIOVASCULAR: normal rate; rhythm is regular;  no systolic murmur; trace pedal edema;     GASTROINTESTINAL: nontender;     LYMPHATIC: no enlargement of cervical or facial nodes; no supraclavicular nodes;     MUSCULOSKELETAL: gait: slowed and uses a walker;  No CVA tenderness     NEUROLOGIC: GROSSLY INTACT     PSYCHIATRIC:  appropriate affect and demeanor; normal speech pattern; grossly normal memory; LOW BACK examination: Inspection: normal;     Palpation: lumbar tenderness;     Muscular Strength: normal;     Range of Motion: LAROM;     Maneuvers: (-) bilateral straight leg  raise.              Assessment:         I10   Essential (primary) hypertension       M54.16   Radiculopathy, lumbar region       R53.1   Weakness           ORDERS:         Meds Prescribed:       [Refilled] Fentanyl 25 mcg/hr Transdermal Patch, Transdermal 72 Hours [Apply 1 patch(es) to upper torso as directed every 72 hours ], #10 (ten) patches, Refills: 0 (zero)                 Plan:         Essential (primary) hypertension        MEDICATIONS: (no change to current medication regimen)     RECOMMENDATIONS given include: perform routine monitoring of blood pressure with home blood pressure cuff.      FOLLOW-UP: Schedule a follow-up visit in 3 months.          Radiculopathy, lumbar region    Controlled substance documentation: Markell reviewed; prior drug screen consistent; consent is reviewed and signed and on the chart.  She is aware of risk of addiction on this medication, understands that she will need to follow up for a review every 3 months and her medications will be adjusted or decreased as deemed appropriate at each visit.  No history of drug or alcohol abuse.  No concerns about diversion or abuse. She denies side effects related to the medication.  She is aware that she may be called in for pill counts.  The dosing of this medication will be reviewed on a regular basis and reduced if possible..  Ongoing use of a controlled substance is necessary for this patient to have a normal quality of life           Prescriptions:       [Refilled] Fentanyl 25 mcg/hr Transdermal Patch, Transdermal 72 Hours [Apply 1 patch(es) to upper torso as directed every 72 hours ], #10 (ten) patches, Refills: 0 (zero)         Weakness        WILL ASK HOME HEALTH TO GET INVOLVED.              Patient Recommendations:        For  Essential (primary) hypertension:    Begin monitoring your blood pressure by brief nurse visits at our office, a home blood pressure monitor, or by checking on the machines in pharmacies or stores.  Keep a log  of the readings.  Schedule a follow-up visit in 3 months.          For  Weakness:    I also recommend WILL ASK HOME HEALTH TO GET INVOLVED..              Charge Capture:         Primary Diagnosis:     I10  Essential (primary) hypertension           Orders:      79119  Office/outpatient visit; established patient, level 4  (In-House)              M54.16  Radiculopathy, lumbar region     R53.1  Weakness

## 2021-05-18 NOTE — PROGRESS NOTES
Amber Delvalle 10/20/1925     Office/Outpatient Visit    Visit Date:  10:04 am    Provider: Pedro Mckenna MD (Assistant: Cyrus Wright)    Location: [facility]        Electronically signed by Pedro Mckenna MD on  2019 11:27:07 AM                             SUBJECTIVE:        CC:     Ms. Delvalle is a 89 year old White female.  This is a follow-up visit.          HPI:         Ms. Delvalle presents with chronic low back pain.  The discomfort is most prominent in the lower thoracic spine and in the lumbar spine.  The pain does not radiate.  She characterizes it as constant and moderate in intensity.  This is a chronic problem, with essentially constant pain.  She states that the current episode of pain started years ago.  She does not recall any precipitating event or injury.  She notes some pain relief with narcotic pain medication.  Other details: MRI SHOWED DJD AND SPINAL STENOSIS.  NO CHANGE WITH INJECTIONS.  HAS FAILED VARIUS MEDS.  DOING WELL ON CURRENT MEDS.  NEEDS RX..      ROS:     CONSTITUTIONAL:  Negative for chills and fever.      E/N/T:  Negative for ear pain, nasal congestion and sore throat.      CARDIOVASCULAR:  Negative for chest pain, palpitations and pedal edema.      RESPIRATORY:  Negative for recent cough and dyspnea.      GASTROINTESTINAL:  Negative for abdominal pain, nausea and vomiting.      GENITOURINARY:  Positive for urinary incontinence CHRONIC BUT WORSE LATELY.      MUSCULOSKELETAL:  Negative for myalgias.      NEUROLOGICAL:  Positive for weakness ( generalized ).          PMH/FMH/SH:     Last Reviewed on 2019 10:39 AM by Pedro Mckenna    Past Medical History:                 PAST MEDICAL HISTORY             GYNECOLOGICAL HISTORY:             CURRENT MEDICAL PROVIDERS:    Oncologist         PREVENTIVE HEALTH MAINTENANCE             COLORECTAL CANCER SCREENING: Up to date (colonoscopy q10y; sigmoidoscopy q5y; Cologuard q3y) was  last done 1998; colonoscopy with normal results         Surgical History:         Appendectomy    Cholecystectomy    Tonsillectomy/Adenoidectomy      Hysterectomy: COMPLETE, WITH BSO;;;     Joint Replacement: LEGT/RIGHT TKR;;;         Family History:     Father: Coronary Artery Disease         Social History:     Occupation:    Retired         Tobacco/Alcohol/Supplements:     Last Reviewed on 1/24/2019 10:38 AM by Pedro Mckenna    Tobacco: She has a past history of cigarette smoking; quit date:  1993.  Non-drinker     Caffeine:  She admits to consuming caffeine via coffee ( 2 servings per day ).          Substance Abuse History:     Last Reviewed on 1/24/2019 10:38 AM by Pedro Mckenna    None         Mental Health History:     Last Reviewed on 10/24/2018 11:31 AM by Rubina Shearer        Communicable Diseases (eg STDs):     Last Reviewed on 10/24/2018 11:31 AM by Rubina Shearer            Current Problems:     Last Reviewed on 1/24/2019 10:40 AM by Pedro Mckenna    Generalized weakness     Breast cancer     GERD     HTN     Acquired hypothyroidism     Chronic low back pain     Use of high risk medications         Immunizations:     Fluzone High-Dose pf (>=65 yr) 10/23/2018     Fluzone High-Dose pf (>=65 yr) 10/24/2017         Allergies:     Last Reviewed on 1/24/2019 10:38 AM by Pedro Mckenna      No Known Drug Allergies.         Current Medications:     Last Reviewed on 1/24/2019 10:39 AM by Pedro Mckenna    Fentanyl 25mcg/1hr Transdermal Patch Apply 1 patch(es) to upper torso as directed every 72 hours     Levothyroxine Sodium 50mcg Capsules 1 p.o. daily     Lisinopril 10mg Tablet Take 1 tablet(s) by mouth daily     Metoprolol Succinate 50mg Tablets, Extended Release Take 1 tablet(s) by mouth daily     Ranitidine 75mg Tablet Take 1 tablet(s) by mouth q hs         OBJECTIVE:        Vitals:         Current: 1/24/2019 10:11:07 AM    Ht:  5 ft, 7 in;  Wt: 224.8  lbs;  BMI: 35.2    T: 98.3 F (oral);  BP: 144/59 mm Hg (left arm, sitting);  P: 94 bpm (left arm (BP Cuff), sitting)        Exams:     PHYSICAL EXAM:     GENERAL: vital signs recorded - well developed, well nourished;  no apparent distress;     NECK: trachea is midline; thyroid is non-palpable; carotid exam reveals no bruits;     RESPIRATORY: normal respiratory rate and pattern with no distress; normal breath sounds with no rales, rhonchi, wheezes or rubs;     CARDIOVASCULAR: normal rate; rhythm is regular;  no systolic murmur; trace pedal edema;     GASTROINTESTINAL: nontender;     LYMPHATIC: no enlargement of cervical or facial nodes;     MUSCULOSKELETAL: gait: slowed and uses a walker;  No CVA tenderness     NEUROLOGIC: GROSSLY INTACT     PSYCHIATRIC:  appropriate affect and demeanor; normal speech pattern; grossly normal memory;         ASSESSMENT           724.2   M54.16  Chronic low back pain              DDx:     788.30   E78.5  Urinary incontinence              DDx:         ORDERS:         Lab Orders:       APPTO  Appointment need  (In-House)         [id number]  [facility] Urinalysis, automated, with micro  (Send-Out)                   PLAN:          Chronic low back pain         FOLLOW-UP: Schedule a follow-up visit in 3 months..  Controlled substance documentation: Markell reviewed; prior drug screen consistent; consent is reviewed and signed and on the chart.  She is aware of risk of addiction on this medication, understands that she will need to follow up for a review every 3 months and her medications will be adjusted or decreased as deemed appropriate at each visit.  No history of drug or alcohol abuse.  No concerns about diversion or abuse. She denies side effects related to the medication.  She is aware that she may be called in for pill counts.  The dosing of this medication will be reviewed on a regular basis and reduced if possible..  Ongoing use of a controlled substance is necessary for this patient  to have a normal quality of life           Orders:       APPTO  Appointment need  (In-House)            Urinary incontinence     LABORATORY:  Labs ordered to be performed today include urinalysis with micro.            Orders:       [id number]  [facility] Urinalysis, automated, with micro  (Send-Out)               Patient Recommendations:        For  Chronic low back pain:     Schedule a follow-up visit in 3 months.                APPOINTMENT INFORMATION:        Monday Tuesday Wednesday Thursday Friday Saturday Sunday            Time:___________________AM  PM   Date:_____________________             CHARGE CAPTURE           **Please note: ICD descriptions below are intended for billing purposes only and may not represent clinical diagnoses**        Primary Diagnosis:         724.2 Chronic low back pain            M54.16    Radiculopathy, lumbar region              Orders:          [id number]   Office/outpatient visit; established patient, level 4  (In-House)             APPTO   Appointment need  (In-House)           788.30 Urinary incontinence            E78.5    Hyperlipidemia, unspecified

## 2021-05-18 NOTE — PROGRESS NOTES
Amber Delvalle 10/20/1925     Office/Outpatient Visit    Visit Date: Wed, Oct 24, 2018 11:15 am    Provider: Pedro Mckenna MD (Assistant: Rubina Shearer MA)    Location: [facility]        Electronically signed by Pedro Mckenna MD on  10/24/2018 06:00:51 PM                             SUBJECTIVE:        CC:     Ms. Delvalle is a 89 year old White female.  Patient is here to establish care. She also is here for medicaiton refills and complains of back pain.;         HPI:         PHQ-9 Depression Screening: Completed form scanned and in chart; Total Score 4 Alcohol Consumption Screening: Completed form scanned and in chart; Total Score 0         With regard to the chronic low back pain, the discomfort is most prominent in the lower thoracic spine and in the lumbar spine.  The pain does not radiate.  She characterizes it as constant and moderate in intensity.  This is a chronic problem, with essentially constant pain.  She states that the current episode of pain started years ago.  She does not recall any precipitating event or injury.  She notes some pain relief with narcotic pain medication.  Other details: MRI SHOWED DJD AND SPINAL STENOSIS.  NO CHANGE WITH INJECTIONS.  HAS FAILED VARIUS MEDS.  DOING WELL ON CURRENT MEDS.  NEEDS RX..      ROS:     CONSTITUTIONAL:  Negative for chills and fever.      E/N/T:  Negative for ear pain, nasal congestion and sore throat.      CARDIOVASCULAR:  Negative for chest pain, palpitations and pedal edema.      RESPIRATORY:  Negative for recent cough and dyspnea.      GASTROINTESTINAL:  Negative for abdominal pain, nausea and vomiting.      MUSCULOSKELETAL:  Negative for myalgias.      NEUROLOGICAL:  Positive for weakness ( generalized ).          PMH/FMH/SH:     Last Reviewed on 10/24/2018 05:49 PM by Pedro Mckenna    Past Medical History:                 PAST MEDICAL HISTORY             GYNECOLOGICAL HISTORY:             CURRENT MEDICAL PROVIDERS:     Oncologist         PREVENTIVE HEALTH MAINTENANCE             COLORECTAL CANCER SCREENING: Up to date (colonoscopy q10y; sigmoidoscopy q5y; Cologuard q3y) was last done 1998; colonoscopy with normal results         Surgical History:         Appendectomy    Cholecystectomy    Tonsillectomy/Adenoidectomy      Hysterectomy: COMPLETE, WITH BSO;;;     Joint Replacement: LEGT/RIGHT TKR;;;         Family History:     Father: Coronary Artery Disease         Social History:     Occupation:    Retired         Tobacco/Alcohol/Supplements:     Last Reviewed on 10/24/2018 05:49 PM by Pedro Mckenna    Tobacco: She has a past history of cigarette smoking; quit date:  1993.  Non-drinker     Caffeine:  She admits to consuming caffeine via coffee ( 2 servings per day ).          Substance Abuse History:     Last Reviewed on 10/24/2018 05:49 PM by Pedro Mckenna    None         Mental Health History:     Last Reviewed on 10/24/2018 11:31 AM by Rubina Shearer        Communicable Diseases (eg STDs):     Last Reviewed on 10/24/2018 11:31 AM by Rubina Shearer            Current Problems:     Last Reviewed on 10/24/2018 05:53 PM by Pedro Mckenna    Breast cancer     GERD     HTN     Acquired hypothyroidism     Chronic low back pain     Use of high risk medications         Immunizations:     Fluzone High-Dose pf (>=65 yr) 10/23/2018     Fluzone High-Dose pf (>=65 yr) 10/24/2017         Allergies:     Last Reviewed on 10/24/2018 05:49 PM by Pedro Mckenna      No Known Drug Allergies.         Current Medications:     Last Reviewed on 10/24/2018 05:49 PM by Pedro Mckenna    Lisinopril 10mg Tablet Take 1 tablet(s) by mouth daily     Metoprolol Succinate 50mg Tablets, Extended Release Take 1 tablet(s) by mouth daily     Fentanyl 25mcg/1hr Transdermal Patch Apply 1 patch(es) to upper torso as directed every 72 hours     Levothyroxine Sodium 50mcg Capsules 1 p.o. daily     Ranitidine 75mg Tablet  Take 1 tablet(s) by mouth q hs         OBJECTIVE:        Vitals:         Current: 10/24/2018 11:25:01 AM    Ht:  5 ft, 7 in;  Wt: 221.2 lbs;  BMI: 34.6    T: 98.2 F (oral);  BP: 100/59 mm Hg (left arm, sitting);  P: 72 bpm (left arm (BP Cuff), sitting)        Exams:     PHYSICAL EXAM:     GENERAL: vital signs recorded - well developed, well nourished;  no apparent distress;     NECK: trachea is midline; thyroid is non-palpable; carotid exam reveals no bruits;     RESPIRATORY: normal respiratory rate and pattern with no distress; normal breath sounds with no rales, rhonchi, wheezes or rubs;     CARDIOVASCULAR: normal rate; rhythm is regular;  no systolic murmur; trace pedal edema;     GASTROINTESTINAL: nontender;     LYMPHATIC: no enlargement of cervical or facial nodes;     MUSCULOSKELETAL: gait: slowed and uses a walker;     NEUROLOGIC: GROSSLY INTACT     PSYCHIATRIC:  appropriate affect and demeanor; normal speech pattern; grossly normal memory; LOW BACK examination: Inspection: normal;     Palpation: lumbar tenderness;     Muscular Strength: normal;     Range of Motion: LAROM;     Maneuvers: (-) bilateral straight leg raise.              Lab/Test Results:         LABORATORY RESULTS:     STATES NEGATIVE ROUTINE LABS < SIX MONTHS AGO;         Urine temperature:  confirmed (10/24/2018),     All urine drug screen levels confirmed negative:  yes (10/24/2018),     Date and time of last pill:  Fentanyl transdermal patch on right now and Norco (from the ER) 10-23-18 @ 9:00AM ./mlb (10/24/2018),     Performed by:  tls (10/24/2018),     Collection Time:  1245 (10/24/2018),             ASSESSMENT           V79.0   Z13.89  Screening for depression              DDx:     724.2   M54.16  Chronic low back pain              DDx:     780.79   R53.1  Generalized weakness              DDx:     V58.69   Z79.899  Use of high risk medications              DDx:         ORDERS:         Meds Prescribed:       Refill of: Fentanyl 25mcg/1hr  Transdermal Patch Apply 1 patch(es) to upper torso as directed every 72 hours  #10 (Ten) patch(es) Refills: 0         Lab Orders:       APPTO  Appointment need  (In-House)         [id number]  Drug test prsmv read direct optical obs pr date  (In-House)                   PLAN:          Chronic low back pain         FOLLOW-UP: Schedule a follow-up visit in 3 months..  Controlled substance documentation: Markell reviewed; drug screen performed and appropriate; consent is reviewed and signed and on the chart.  She is aware of risk of addiction on this medication, understands that she will need to follow up for a review every 3 months and her medications will be adjusted or decreased as deemed appropriate at each visit.  No history of drug or alcohol abuse.  No concerns about diversion or abuse. She denies side effects related to the medication.  She is aware that she may be called in for pill counts.  The dosing of this medication will be reviewed on a regular basis and reduced if possible..  Ongoing use of a controlled substance is necessary for this patient to have a normal quality of life           Prescriptions:       Refill of: Fentanyl 25mcg/1hr Transdermal Patch Apply 1 patch(es) to upper torso as directed every 72 hours  #10 (Ten) patch(es) Refills: 0           Orders:       APPTO  Appointment need  (In-House)            Generalized weakness         WILL ASK HOME HEALTH TO PICK HER UP FOR PT/OT          Use of high risk medications           Orders:       [id number]  Drug test prsmv read direct optical obs pr date  (In-House)               Patient Recommendations:        For  Chronic low back pain:     Schedule a follow-up visit in 3 months.                APPOINTMENT INFORMATION:        Monday Tuesday Wednesday Thursday Friday Saturday Sunday            Time:___________________AM  PM   Date:_____________________         For  Generalized weakness:     I also recommend WILL ASK HOME HEALTH TO PICK HER UP FOR PT/OT.               CHARGE CAPTURE           **Please note: ICD descriptions below are intended for billing purposes only and may not represent clinical diagnoses**        Primary Diagnosis:         V79.0 Screening for depression            Z13.89    Encounter for screening for other disorder    724.2 Chronic low back pain            M54.16    Radiculopathy, lumbar region              Orders:          [id number]   Office visit - new pt, level 3  (In-House)             APPTO   Appointment need  (In-House)           780.79 Generalized weakness            R53.1    Weakness    V58.69 Use of high risk medications            Z79.899    Other long term (current) drug therapy              Orders:          [id number]   Drug test prsmv read direct optical obs pr date  (In-House)

## 2021-07-01 VITALS
WEIGHT: 221.2 LBS | BODY MASS INDEX: 34.72 KG/M2 | HEART RATE: 72 BPM | HEIGHT: 67 IN | DIASTOLIC BLOOD PRESSURE: 59 MMHG | SYSTOLIC BLOOD PRESSURE: 100 MMHG | TEMPERATURE: 98.2 F

## 2021-07-01 VITALS
BODY MASS INDEX: 35.31 KG/M2 | TEMPERATURE: 98 F | HEIGHT: 67 IN | WEIGHT: 225 LBS | SYSTOLIC BLOOD PRESSURE: 143 MMHG | HEART RATE: 59 BPM | DIASTOLIC BLOOD PRESSURE: 50 MMHG

## 2021-07-01 VITALS
DIASTOLIC BLOOD PRESSURE: 66 MMHG | TEMPERATURE: 98.6 F | HEART RATE: 77 BPM | SYSTOLIC BLOOD PRESSURE: 153 MMHG | WEIGHT: 231.4 LBS | HEIGHT: 67 IN | BODY MASS INDEX: 36.32 KG/M2

## 2021-07-01 VITALS
WEIGHT: 224.8 LBS | DIASTOLIC BLOOD PRESSURE: 59 MMHG | HEART RATE: 94 BPM | SYSTOLIC BLOOD PRESSURE: 144 MMHG | BODY MASS INDEX: 35.28 KG/M2 | HEIGHT: 67 IN | TEMPERATURE: 98.3 F

## 2021-07-01 VITALS
HEIGHT: 67 IN | DIASTOLIC BLOOD PRESSURE: 64 MMHG | WEIGHT: 227 LBS | TEMPERATURE: 98.2 F | HEART RATE: 68 BPM | SYSTOLIC BLOOD PRESSURE: 150 MMHG | BODY MASS INDEX: 35.63 KG/M2

## 2021-07-01 VITALS
WEIGHT: 237.2 LBS | HEART RATE: 86 BPM | TEMPERATURE: 98.3 F | SYSTOLIC BLOOD PRESSURE: 171 MMHG | HEIGHT: 67 IN | DIASTOLIC BLOOD PRESSURE: 72 MMHG | BODY MASS INDEX: 37.23 KG/M2